# Patient Record
Sex: MALE | Race: WHITE | NOT HISPANIC OR LATINO
[De-identification: names, ages, dates, MRNs, and addresses within clinical notes are randomized per-mention and may not be internally consistent; named-entity substitution may affect disease eponyms.]

---

## 2023-07-26 ENCOUNTER — NON-APPOINTMENT (OUTPATIENT)
Age: 68
End: 2023-07-26

## 2023-07-27 ENCOUNTER — APPOINTMENT (OUTPATIENT)
Dept: PULMONOLOGY | Facility: CLINIC | Age: 68
End: 2023-07-27
Payer: COMMERCIAL

## 2023-07-27 VITALS
DIASTOLIC BLOOD PRESSURE: 70 MMHG | SYSTOLIC BLOOD PRESSURE: 165 MMHG | HEIGHT: 72 IN | BODY MASS INDEX: 36.3 KG/M2 | OXYGEN SATURATION: 95 % | RESPIRATION RATE: 16 BRPM | WEIGHT: 268 LBS | HEART RATE: 80 BPM

## 2023-07-27 DIAGNOSIS — Z86.39 PERSONAL HISTORY OF OTHER ENDOCRINE, NUTRITIONAL AND METABOLIC DISEASE: ICD-10-CM

## 2023-07-27 DIAGNOSIS — Z86.79 PERSONAL HISTORY OF OTHER DISEASES OF THE CIRCULATORY SYSTEM: ICD-10-CM

## 2023-07-27 PROBLEM — Z00.00 ENCOUNTER FOR PREVENTIVE HEALTH EXAMINATION: Status: ACTIVE | Noted: 2023-07-27

## 2023-07-27 PROCEDURE — 99204 OFFICE O/P NEW MOD 45 MIN: CPT

## 2023-07-27 RX ORDER — INSULIN GLARGINE 300 U/ML
INJECTION, SOLUTION SUBCUTANEOUS
Refills: 0 | Status: ACTIVE | COMMUNITY

## 2023-07-27 RX ORDER — NEBIVOLOL HYDROCHLORIDE 20 MG/1
TABLET ORAL
Refills: 0 | Status: ACTIVE | COMMUNITY

## 2023-07-27 RX ORDER — ASPIRIN 325 MG/1
TABLET, FILM COATED ORAL
Refills: 0 | Status: ACTIVE | COMMUNITY

## 2023-07-27 RX ORDER — ALLOPURINOL 100 MG/1
100 TABLET ORAL
Refills: 0 | Status: ACTIVE | COMMUNITY

## 2023-07-27 RX ORDER — GLYBURIDE AND METFORMIN HYDROCHLORIDE 5; 500 MG/1; MG/1
5-500 TABLET, FILM COATED ORAL
Refills: 0 | Status: ACTIVE | COMMUNITY

## 2023-07-27 RX ORDER — METFORMIN HYDROCHLORIDE 625 MG/1
TABLET ORAL
Refills: 0 | Status: ACTIVE | COMMUNITY

## 2023-07-27 RX ORDER — ORAL SEMAGLUTIDE 14 MG/1
TABLET ORAL
Refills: 0 | Status: ACTIVE | COMMUNITY

## 2023-07-27 NOTE — PHYSICAL EXAM
[No Acute Distress] : no acute distress [Normal Rate/Rhythm] : normal rate/rhythm [Normal S1, S2] : normal s1, s2 [No Murmurs] : no murmurs [No Rubs] : no rubs [No Gallops] : no gallops [No Resp Distress] : no resp distress [No Acc Muscle Use] : no acc muscle use [Normal Rhythm and Effort] : normal rhythm and effort [Normal to Percussion] : normal to percussion [No Abnormalities] : no abnormalities [Normal Gait] : normal gait [No Clubbing] : no clubbing [No Cyanosis] : no cyanosis [No Edema] : no edema [FROM] : FROM [No Focal Deficits] : no focal deficits [Oriented x3] : oriented x3 [Normal Affect] : normal affect

## 2023-07-27 NOTE — REVIEW OF SYSTEMS
[Nasal Congestion] : nasal congestion [Hay Fever] : hay fever [GERD] : gerd [Negative] : Endocrine [TextBox_30] : see HPI

## 2023-07-27 NOTE — DISCUSSION/SUMMARY
[FreeTextEntry1] : Chronic cough- multifactorial, rhinitis, GERD, and asthma by hx\par Heavy snoring and fatigue noted, along with BMI ( 36), will obtain home sleep study\par Current lung exam is normal , normal sp02\par Has prn rescue\par CXR\par Spirometry\par Pepcid qHS, no late night eating, HOB\par Flonase daily\par 6 weeks or sooner if needed

## 2023-07-27 NOTE — CONSULT LETTER
[Dear  ___] : Dear  [unfilled], [Consult Letter:] : I had the pleasure of evaluating your patient, [unfilled]. [Please see my note below.] : Please see my note below. [Sincerely,] : Sincerely, [FreeTextEntry3] : Benson Navarro DO [facility]\par Pulmonary Critical Care\par Director Pulmonary Division\par Medical Director Respiratory Therapy\par [facility]\par \par

## 2023-08-14 ENCOUNTER — OUTPATIENT (OUTPATIENT)
Dept: OUTPATIENT SERVICES | Facility: HOSPITAL | Age: 68
LOS: 1 days | End: 2023-08-14
Payer: COMMERCIAL

## 2023-08-14 DIAGNOSIS — G47.33 OBSTRUCTIVE SLEEP APNEA (ADULT) (PEDIATRIC): ICD-10-CM

## 2023-08-14 PROCEDURE — 95800 SLP STDY UNATTENDED: CPT | Mod: 26

## 2023-08-14 PROCEDURE — 95800 SLP STDY UNATTENDED: CPT

## 2023-12-13 ENCOUNTER — APPOINTMENT (OUTPATIENT)
Dept: PULMONOLOGY | Facility: CLINIC | Age: 68
End: 2023-12-13
Payer: COMMERCIAL

## 2023-12-13 VITALS — OXYGEN SATURATION: 96 % | HEART RATE: 83 BPM

## 2023-12-13 VITALS — HEIGHT: 74 IN | BODY MASS INDEX: 34.52 KG/M2 | WEIGHT: 269 LBS

## 2023-12-13 VITALS — RESPIRATION RATE: 16 BRPM | DIASTOLIC BLOOD PRESSURE: 78 MMHG | SYSTOLIC BLOOD PRESSURE: 126 MMHG

## 2023-12-13 DIAGNOSIS — R05.3 CHRONIC COUGH: ICD-10-CM

## 2023-12-13 DIAGNOSIS — Z78.9 OTHER SPECIFIED HEALTH STATUS: ICD-10-CM

## 2023-12-13 PROCEDURE — 99215 OFFICE O/P EST HI 40 MIN: CPT | Mod: 25

## 2023-12-13 PROCEDURE — 94010 BREATHING CAPACITY TEST: CPT

## 2023-12-13 NOTE — CONSULT LETTER
[Dear  ___] : Dear  [unfilled], [Consult Letter:] : I had the pleasure of evaluating your patient, [unfilled]. [Please see my note below.] : Please see my note below. [Sincerely,] : Sincerely, [FreeTextEntry3] : Benson Navarro DO [facility]\par  Pulmonary Critical Care\par  Director Pulmonary Division\par  Medical Director Respiratory Therapy\par  [facility]\par  \par

## 2023-12-13 NOTE — HISTORY OF PRESENT ILLNESS
[Never] : never [TextBox_4] : Hx asthma as child was good as adult but coughs linger and colds stay for a long time by hx has 2 dogs never smoker rare rescue inhaler use no maintenance  heavy snorer has active rhinitis gets reflux not very active Cardiology Dr Arboleda  12/13/23 cough waxes and wanes no active GERD no active sinus- zyrtec for drip no sig rescue no fever, chill, chest pain

## 2023-12-13 NOTE — DISCUSSION/SUMMARY
[FreeTextEntry1] : Asthma moderate persistent, complicated by Moderate RACHEL ( AHI 26) No active rhinitis, GERD Spirometry with moderate obstruction CXR negative, cardiology work up in progress Current lung exam is normal , normal sp02 Has prn rescue, samples of Trelegy 200 given and technique reviewed asthma profile Discussed with sleep MD, autopap ordered, will arrange sleep f/u 2 months or sooner if needed with osvaldo

## 2024-02-29 ENCOUNTER — APPOINTMENT (OUTPATIENT)
Dept: PULMONOLOGY | Facility: CLINIC | Age: 69
End: 2024-02-29
Payer: COMMERCIAL

## 2024-02-29 ENCOUNTER — TRANSCRIPTION ENCOUNTER (OUTPATIENT)
Age: 69
End: 2024-02-29

## 2024-02-29 VITALS — WEIGHT: 264 LBS | HEIGHT: 74 IN | BODY MASS INDEX: 33.88 KG/M2

## 2024-02-29 VITALS
SYSTOLIC BLOOD PRESSURE: 130 MMHG | RESPIRATION RATE: 16 BRPM | DIASTOLIC BLOOD PRESSURE: 72 MMHG | OXYGEN SATURATION: 95 % | HEART RATE: 58 BPM

## 2024-02-29 DIAGNOSIS — G47.33 OBSTRUCTIVE SLEEP APNEA (ADULT) (PEDIATRIC): ICD-10-CM

## 2024-02-29 DIAGNOSIS — J45.909 UNSPECIFIED ASTHMA, UNCOMPLICATED: ICD-10-CM

## 2024-02-29 DIAGNOSIS — J45.40 MODERATE PERSISTENT ASTHMA, UNCOMPLICATED: ICD-10-CM

## 2024-02-29 PROCEDURE — 94010 BREATHING CAPACITY TEST: CPT

## 2024-02-29 PROCEDURE — 99214 OFFICE O/P EST MOD 30 MIN: CPT | Mod: 25

## 2024-02-29 NOTE — DISCUSSION/SUMMARY
[FreeTextEntry1] : Asthma now mild intermittent, complicated by Moderate RACHEL ( AHI 26) No active rhinitis, GERD Spirometry with moderate obstruction prior, now NORMAL on Trelegy 200 Continue Trelegy daily CXR negative, cardiology work up negative per pt Current lung exam is normal , normal sp02 Needs asthma profile, denies any active GERD or rhinitis Discussed with sleep apnea, he doesnt wish to be treated, risks reviewed, including driving while fatigued 6 months or sooner if needed

## 2024-02-29 NOTE — PHYSICAL EXAM
[No Acute Distress] : no acute distress [Normal Rate/Rhythm] : normal rate/rhythm [Normal S1, S2] : normal s1, s2 [No Murmurs] : no murmurs [No Rubs] : no rubs [No Gallops] : no gallops [No Acc Muscle Use] : no acc muscle use [No Resp Distress] : no resp distress [Normal Rhythm and Effort] : normal rhythm and effort [Normal to Percussion] : normal to percussion [No Abnormalities] : no abnormalities [Normal Gait] : normal gait [No Clubbing] : no clubbing [No Cyanosis] : no cyanosis [FROM] : FROM [No Edema] : no edema [No Focal Deficits] : no focal deficits [Oriented x3] : oriented x3 [Normal Affect] : normal affect

## 2024-02-29 NOTE — HISTORY OF PRESENT ILLNESS
[Never] : never [TextBox_4] : Hx asthma as child was good as adult but coughs linger and colds stay for a long time by hx has 2 dogs never smoker rare rescue inhaler use no maintenance  heavy snorer has active rhinitis gets reflux not very active Cardiology Dr Arboleda  2/29/24 Using Trelegy 200  no fever, chills, chest pain no active rhinitis or GERD not using cpap

## 2024-02-29 NOTE — CONSULT LETTER
[Dear  ___] : Dear  [unfilled], [Consult Letter:] : I had the pleasure of evaluating your patient, [unfilled]. [Please see my note below.] : Please see my note below. [Sincerely,] : Sincerely, [FreeTextEntry3] : Benson Navarro DO [facility]\par  Pulmonary Critical Care\par  Director Pulmonary Division\par  Medical Director Respiratory Therapy\par  [facility]\par  \par

## 2024-03-27 ENCOUNTER — APPOINTMENT (OUTPATIENT)
Dept: PULMONOLOGY | Facility: CLINIC | Age: 69
End: 2024-03-27

## 2024-04-25 ENCOUNTER — TRANSCRIPTION ENCOUNTER (OUTPATIENT)
Age: 69
End: 2024-04-25

## 2024-04-25 RX ORDER — FLUTICASONE FUROATE, UMECLIDINIUM BROMIDE AND VILANTEROL TRIFENATATE 200; 62.5; 25 UG/1; UG/1; UG/1
200-62.5-25 POWDER RESPIRATORY (INHALATION)
Qty: 1 | Refills: 6 | Status: ACTIVE | COMMUNITY
Start: 2024-04-25 | End: 1900-01-01

## 2024-08-29 ENCOUNTER — APPOINTMENT (OUTPATIENT)
Dept: PULMONOLOGY | Facility: CLINIC | Age: 69
End: 2024-08-29